# Patient Record
Sex: MALE | Race: WHITE | NOT HISPANIC OR LATINO
[De-identification: names, ages, dates, MRNs, and addresses within clinical notes are randomized per-mention and may not be internally consistent; named-entity substitution may affect disease eponyms.]

---

## 2021-01-28 PROBLEM — Z00.00 ENCOUNTER FOR PREVENTIVE HEALTH EXAMINATION: Status: ACTIVE | Noted: 2021-01-28

## 2021-01-29 ENCOUNTER — APPOINTMENT (OUTPATIENT)
Dept: OTOLARYNGOLOGY | Facility: CLINIC | Age: 56
End: 2021-01-29
Payer: COMMERCIAL

## 2021-01-29 VITALS
HEART RATE: 87 BPM | BODY MASS INDEX: 38.6 KG/M2 | TEMPERATURE: 97.1 F | HEIGHT: 72 IN | WEIGHT: 285 LBS | SYSTOLIC BLOOD PRESSURE: 139 MMHG | DIASTOLIC BLOOD PRESSURE: 80 MMHG

## 2021-01-29 DIAGNOSIS — Z80.1 FAMILY HISTORY OF MALIGNANT NEOPLASM OF TRACHEA, BRONCHUS AND LUNG: ICD-10-CM

## 2021-01-29 DIAGNOSIS — Z86.79 PERSONAL HISTORY OF OTHER DISEASES OF THE CIRCULATORY SYSTEM: ICD-10-CM

## 2021-01-29 DIAGNOSIS — R06.83 SNORING: ICD-10-CM

## 2021-01-29 DIAGNOSIS — F17.200 NICOTINE DEPENDENCE, UNSPECIFIED, UNCOMPLICATED: ICD-10-CM

## 2021-01-29 PROCEDURE — 99072 ADDL SUPL MATRL&STAF TM PHE: CPT

## 2021-01-29 PROCEDURE — 99204 OFFICE O/P NEW MOD 45 MIN: CPT | Mod: 25

## 2021-01-29 PROCEDURE — 69210 REMOVE IMPACTED EAR WAX UNI: CPT

## 2021-01-29 RX ORDER — METOPROLOL TARTRATE 100 MG/1
100 TABLET, FILM COATED ORAL
Refills: 0 | Status: ACTIVE | COMMUNITY

## 2021-01-29 RX ORDER — ATORVASTATIN CALCIUM 10 MG/1
10 TABLET, FILM COATED ORAL
Refills: 0 | Status: ACTIVE | COMMUNITY

## 2021-01-29 RX ORDER — UBIDECARENONE/VIT E ACET 100MG-5
CAPSULE ORAL
Refills: 0 | Status: ACTIVE | COMMUNITY

## 2021-01-29 RX ORDER — GABAPENTIN 300 MG/1
300 CAPSULE ORAL
Refills: 0 | Status: ACTIVE | COMMUNITY

## 2021-01-29 NOTE — ADDENDUM
[FreeTextEntry1] : Documented by Isabel Sena acting as scribe for Dr. Magallon on 01/29/2021.\par \par All Medical record entries made by the Scribe were at my, Dr. Magallon, direction and personally dictated by me on 01/29/2021 . I have reviewed the chart and agree that the record accurately reflects my personal performance of the history, physical exam, assessment and plan. I have also personally directed, reviewed, and agreed with the discharge instructions.

## 2021-01-29 NOTE — ASSESSMENT
[FreeTextEntry1] : Reviewed and reconciled medications, allergies, PMHx, PSHx, SocHx, FMHx.\par \par h/o sleep apnea - uses CPAP\par right parotid mass in the body of the gland\par second right parotid mass in the tail or may be a separation and may be a lymph node\par bilateral cerumen impactions\par \par Plan:\par Spoke on the phone with pt's wife. Cerumen removed. MRI parotid. Discussed r/b/a of bx pending MRI results. FU after test.

## 2021-01-29 NOTE — HISTORY OF PRESENT ILLNESS
[de-identified] : The patient presents with a mass in the right parotid gland. He has one superiorly that he has had for years and one inferiorly that he has had for a few months. Denies any pain or problems with the skin on the right side. He notes that he has had skin lesions removed that were benign. He uses drops in his ears twice per month for the cerumen. Pt is a smoker. FMHx of breast CA and lung CA with pt's mother and lung CA with pt's father. h/o sleep apnea - uses CPAP.

## 2021-01-29 NOTE — CONSULT LETTER
[Dear  ___] : Dear  [unfilled], [Consult Letter:] : I had the pleasure of evaluating your patient, [unfilled]. [Please see my note below.] : Please see my note below. [Consult Closing:] : Thank you very much for allowing me to participate in the care of this patient.  If you have any questions, please do not hesitate to contact me. [Sincerely,] : Sincerely, [FreeTextEntry3] : Everett Magallon MD FACS

## 2021-01-29 NOTE — REVIEW OF SYSTEMS
[Problem Snoring] : problem snoring [Snoring With Pauses] : snoring with pauses [Swelling Neck] : swelling neck [Swelling Face] : face swelling [Swollen Glands] : swollen glands [Negative] : Endocrine

## 2021-01-29 NOTE — PHYSICAL EXAM
[Hearing Fontaine Test (Tuning Fork On Forehead)] : no lateralization of tone [Normal] : no rashes [FreeTextEntry8] : cerumen impaction removed via curettage  [FreeTextEntry9] : cerumen impaction removed via curettage and suction [FreeTextEntry1] : tonsils class 3, type 3 oral cavity, narrowed airway between tonsils and uvula, bony exostosis floor of mouth, no mucosal lesions [FreeTextEntry2] : sinuses nontender to percussion, pigmentation changes right temporal region which appear benign, raised lesion right scalp

## 2021-02-24 ENCOUNTER — APPOINTMENT (OUTPATIENT)
Dept: OTOLARYNGOLOGY | Facility: CLINIC | Age: 56
End: 2021-02-24
Payer: COMMERCIAL

## 2021-02-24 VITALS
WEIGHT: 285 LBS | SYSTOLIC BLOOD PRESSURE: 130 MMHG | BODY MASS INDEX: 38.6 KG/M2 | TEMPERATURE: 97.2 F | HEART RATE: 85 BPM | DIASTOLIC BLOOD PRESSURE: 80 MMHG | HEIGHT: 72 IN

## 2021-02-24 PROCEDURE — 99072 ADDL SUPL MATRL&STAF TM PHE: CPT

## 2021-02-24 PROCEDURE — 99214 OFFICE O/P EST MOD 30 MIN: CPT

## 2021-02-24 RX ORDER — AMLODIPINE BESYLATE 10 MG/1
10 TABLET ORAL
Refills: 0 | Status: ACTIVE | COMMUNITY

## 2021-02-24 RX ORDER — VALSARTAN AND HYDROCHLOROTHIAZIDE 320; 12.5 MG/1; MG/1
320-12.5 TABLET, FILM COATED ORAL
Refills: 0 | Status: ACTIVE | COMMUNITY
Start: 2021-02-24

## 2021-02-24 RX ORDER — ALBUTEROL SULFATE 90 UG/1
108 (90 BASE) POWDER, METERED RESPIRATORY (INHALATION)
Refills: 0 | Status: ACTIVE | COMMUNITY
Start: 2021-02-24

## 2021-02-24 RX ORDER — VALSARTAN 80 MG/1
80 TABLET ORAL
Refills: 0 | Status: DISCONTINUED | COMMUNITY
End: 2021-02-24

## 2021-02-24 NOTE — ASSESSMENT
[FreeTextEntry1] : Reviewed and reconciled medications, allergies, PMHx, PSHx, SocHx, FMHx.\par \par h/o sleep apnea - uses CPAP, right parotid mass in the body of the gland and second right parotid mass in the tail or may be a separation and may be a lymph node\par \par MRI neck 2/9/21: 2 masses in the right parotid one in the body and one in the tail, 1 mass left side in the tail of the parotid\par \par Plan:\par US guided FNA bx parotid masses. FU after test.

## 2021-02-24 NOTE — HISTORY OF PRESENT ILLNESS
[de-identified] : The patient presents with h/o sleep apnea - uses CPAP, right parotid mass in the body of the gland and second right parotid mass in the tail or may be a separation and may be a lymph node. He presents today for results.

## 2021-02-24 NOTE — CONSULT LETTER
[Dear  ___] : Dear  [unfilled], [Courtesy Letter:] : I had the pleasure of seeing your patient, [unfilled], in my office today. [Please see my note below.] : Please see my note below. [Consult Closing:] : Thank you very much for allowing me to participate in the care of this patient.  If you have any questions, please do not hesitate to contact me. [Sincerely,] : Sincerely, [FreeTextEntry3] : Everett Magallon MD FACS

## 2021-02-24 NOTE — ADDENDUM
[FreeTextEntry1] : Documented by Isabel Sena acting as scribe for Dr. Magallon on 02/24/2021.\par \par All Medical record entries made by the Scribe were at my, Dr. Magallon, direction and personally dictated by me on 02/24/2021 . I have reviewed the chart and agree that the record accurately reflects my personal performance of the history, physical exam, assessment and plan. I have also personally directed, reviewed, and agreed with the discharge instructions.

## 2021-03-26 ENCOUNTER — LABORATORY RESULT (OUTPATIENT)
Age: 56
End: 2021-03-26

## 2021-03-26 ENCOUNTER — APPOINTMENT (OUTPATIENT)
Dept: OTOLARYNGOLOGY | Facility: CLINIC | Age: 56
End: 2021-03-26
Payer: COMMERCIAL

## 2021-03-26 VITALS
WEIGHT: 285 LBS | TEMPERATURE: 97.2 F | SYSTOLIC BLOOD PRESSURE: 140 MMHG | DIASTOLIC BLOOD PRESSURE: 80 MMHG | BODY MASS INDEX: 38.6 KG/M2 | HEIGHT: 72 IN

## 2021-03-26 PROCEDURE — 10021 FNA BX W/O IMG GDN 1ST LES: CPT

## 2021-03-26 PROCEDURE — 99072 ADDL SUPL MATRL&STAF TM PHE: CPT

## 2021-03-26 PROCEDURE — 99214 OFFICE O/P EST MOD 30 MIN: CPT | Mod: 25

## 2021-03-26 RX ORDER — ASPIRIN 81 MG/1
81 TABLET, CHEWABLE ORAL
Refills: 0 | Status: COMPLETED | COMMUNITY
Start: 2021-02-24 | End: 2021-03-26

## 2021-03-26 NOTE — ASSESSMENT
[FreeTextEntry1] : Reviewed and reconciled medications, allergies, PMHx, PSHx, SocHx, FMHx. \par \par h/o sleep apnea - uses CPAP, right parotid mass in the body of the gland and second right parotid mass in the tail or may be a separation and may be a lymph node\par pain and discomfort over the right parotid\par \par MRI neck 2/9/21: 2 masses in the right parotid one in the body and one in the tail, 1 mass left side in the tail of the parotid, suggesting Warthin's tumors\par \par FNA bx right parotid masses 3/15/21: benign findings, hemorrhagic cyst contents\par \par Plan:\par Reviewed MRI and FNA bx results. I&D Right Parotid Mass - specimen sent out for pathology - pending results. Ice the right parotid. Discussed r/b/a of of excision if mass keeps filling up. Scopolamine disc. Pt urged to stop smoking. FU 1 week.

## 2021-03-26 NOTE — PROCEDURE
[FreeTextEntry1] : I&D Right Parotid Masses [FreeTextEntry3] : Procedure was explained to the patient. The risks, benefits, and alternatives were discussed with the patient. Patient gave consent to continue. Alcohol was used to clean the area. A needle was used to aspirate the masses. Blood was aspirated from the masses, anterior greater than tail. Specimen collected and sent out for pathology. Pt tolerate the procedure well.

## 2021-03-26 NOTE — HISTORY OF PRESENT ILLNESS
[de-identified] : The patient presents with h/o sleep apnea - uses CPAP, right parotid mass in the body of the gland and second right parotid mass in the tail or may be a separation and may be a lymph node. Pt presents today for results. He has been in a lot of pain the past few days and is very uncomfortable. There is swelling over the right parotid. He d/c the Aspirin. Pt is a smoker.

## 2021-03-26 NOTE — ADDENDUM
[FreeTextEntry1] : Documented by Isabel Sena acting as scribe for Dr. Magallon on 03/26/2021.\par \par All Medical record entries made by the Scribe were at my, Dr. Magallon, direction and personally dictated by me on 03/26/2021 . I have reviewed the chart and agree that the record accurately reflects my personal performance of the history, physical exam, assessment and plan. I have also personally directed, reviewed, and agreed with the discharge instructions.

## 2021-04-02 ENCOUNTER — APPOINTMENT (OUTPATIENT)
Dept: OTOLARYNGOLOGY | Facility: CLINIC | Age: 56
End: 2021-04-02
Payer: COMMERCIAL

## 2021-04-02 VITALS
SYSTOLIC BLOOD PRESSURE: 134 MMHG | HEIGHT: 72 IN | BODY MASS INDEX: 38.6 KG/M2 | WEIGHT: 285 LBS | HEART RATE: 82 BPM | TEMPERATURE: 98.2 F | DIASTOLIC BLOOD PRESSURE: 74 MMHG

## 2021-04-02 PROCEDURE — 99072 ADDL SUPL MATRL&STAF TM PHE: CPT

## 2021-04-02 PROCEDURE — 10021 FNA BX W/O IMG GDN 1ST LES: CPT

## 2021-04-02 PROCEDURE — 99213 OFFICE O/P EST LOW 20 MIN: CPT | Mod: 25

## 2021-04-02 NOTE — HISTORY OF PRESENT ILLNESS
[de-identified] : The patient presents with h/o sleep apnea - uses CPAP and 2 right parotid masses - pathology showed hemorrhagic cyst. He still feels the mass is "rock hard". He has slightly less pain. He has been using the Scopolamine disc with no improvement.

## 2021-04-02 NOTE — ASSESSMENT
[FreeTextEntry1] : Reviewed and reconciled medications, allergies, PMHx, PSHx, SocHx, FMHx.\par \par h/o sleep apnea - uses CPAP and 2 right parotid masses - pathology showed hemorrhagic cyst\par continued swelling\par \par Plan:\par I&D Right Parotid Masses. Will consult head and neck guys regarding possible injection of tetracycline. FU after pathology results come in.

## 2021-04-02 NOTE — ADDENDUM
[FreeTextEntry1] : Documented by Isabel Sena acting as scribe for Dr. Magallon on 04/02/2021.\par \par All Medical record entries made by the Scribe were at my, Dr. Magallon, direction and personally dictated by me on 04/02/2021 . I have reviewed the chart and agree that the record accurately reflects my personal performance of the history, physical exam, assessment and plan. I have also personally directed, reviewed, and agreed with the discharge instructions.

## 2021-04-02 NOTE — PROCEDURE
[FreeTextEntry1] : I&D Right Parotid Masses [FreeTextEntry3] : Procedure was explained to the patient. The risks, benefits, and alternatives were discussed with the patient. Patient gave consent to continue. Alcohol was used to clean the area. A needle was used to aspirate the masses. 3 cc of blood was aspirated from the masses. Specimen collected and sent out for pathology. Pt tolerate the procedure well.

## 2021-04-02 NOTE — CONSULT LETTER
[Courtesy Letter:] : I had the pleasure of seeing your patient, [unfilled], in my office today. [Please see my note below.] : Please see my note below. [Consult Closing:] : Thank you very much for allowing me to participate in the care of this patient.  If you have any questions, please do not hesitate to contact me. [Sincerely,] : Sincerely, [FreeTextEntry3] : Everett Magallon MD FACS

## 2021-04-02 NOTE — REASON FOR VISIT
[Subsequent Evaluation] : a subsequent evaluation for [FreeTextEntry2] : follow up mass right side of face

## 2021-04-06 LAB — CORE LAB BIOPSY: NORMAL

## 2022-11-26 ENCOUNTER — NON-APPOINTMENT (OUTPATIENT)
Age: 57
End: 2022-11-26

## 2022-11-29 ENCOUNTER — APPOINTMENT (OUTPATIENT)
Dept: OTOLARYNGOLOGY | Facility: CLINIC | Age: 57
End: 2022-11-29

## 2022-11-29 VITALS
DIASTOLIC BLOOD PRESSURE: 83 MMHG | WEIGHT: 285 LBS | SYSTOLIC BLOOD PRESSURE: 129 MMHG | HEART RATE: 83 BPM | HEIGHT: 72 IN | BODY MASS INDEX: 38.6 KG/M2

## 2022-11-29 DIAGNOSIS — K11.6 MUCOCELE OF SALIVARY GLAND: ICD-10-CM

## 2022-11-29 PROCEDURE — 99214 OFFICE O/P EST MOD 30 MIN: CPT | Mod: 25

## 2022-11-29 PROCEDURE — 69210 REMOVE IMPACTED EAR WAX UNI: CPT

## 2022-11-29 PROCEDURE — 42400 BIOPSY OF SALIVARY GLAND: CPT

## 2022-11-29 NOTE — PROCEDURE
[FreeTextEntry1] : parotid mass [FreeTextEntry2] : Procedure: Bilateral I&D parotid mass [FreeTextEntry3] : Procedure: Bilateral I&D parotid mass Procedure was explained to the patient. The risks, benefits, and alternatives were discussed with the patient. Patient gave consent to continue.A needle was used to aspirate the mass. Pt was provided ice water to gargle with. Pt tolerate the procedure well.\par

## 2022-11-29 NOTE — CONSULT LETTER
[Dear  ___] : Dear  [unfilled], [Courtesy Letter:] : I had the pleasure of seeing your patient, [unfilled], in my office today. [Please see my note below.] : Please see my note below. [Referral Closing:] : Thank you very much for seeing this patient.  If you have any questions, please do not hesitate to contact me. [Sincerely,] : Sincerely, [FreeTextEntry3] : Everett Magallon MD FACS\par

## 2022-11-29 NOTE — ASSESSMENT
[FreeTextEntry1] : Reviewed and reconciled medications, allergies, PMHx, PSHx, SocHx, FMHx.\par \par h/o sleep apnea - uses CPAP and 2 right parotid masses - pathology showed hemorrhagic cyst\par continued swelling\par \par physical exam:\par cerumen impaction removed via curettage bilaterally\par type 3 oral cavity type 3 tonsil\par large uvula\par \par \par \par Procedure: Bilateral I&D parotid mass Procedure was explained to the patient. The risks, benefits, and alternatives were discussed with the patient. Patient gave consent to continue.A needle was used to aspirate the mass. Pt was provided ice water to gargle with. Pt tolerate the procedure well.\par \par Patient interested in having mass removed in OR. Discussed r/b/a of bilateral parotid mass removal\par \par Plan:\par I&D bilateral Parotid Masses.\par Ultasound of mass\par Pathology sent for both ID locations\par Follow up after test\par

## 2022-11-29 NOTE — HISTORY OF PRESENT ILLNESS
[de-identified] : The patient presents with h/o sleep apnea - uses CPAP and 2 right parotid masses - pathology showed hemorrhagic cyst. He states he feels like parotid mass has gotten larger especially on the right side and somewhat on the left

## 2022-11-29 NOTE — PHYSICAL EXAM
[Normal] : salivary glands are normal [Hearing Loss Right Only] : normal [Hearing Loss Left Only] : normal [FreeTextEntry8] : cerumen impaction removed via curettage [FreeTextEntry9] : cerumen impaction removed via curettage [de-identified] : tonsil class 3 , type 3 oral cavity

## 2022-12-02 LAB — CORE LAB BIOPSY: NORMAL

## 2022-12-05 LAB — BACTERIA FLD CULT: NORMAL

## 2022-12-07 ENCOUNTER — TRANSCRIPTION ENCOUNTER (OUTPATIENT)
Age: 57
End: 2022-12-07

## 2022-12-15 ENCOUNTER — TRANSCRIPTION ENCOUNTER (OUTPATIENT)
Age: 57
End: 2022-12-15

## 2022-12-22 ENCOUNTER — APPOINTMENT (OUTPATIENT)
Dept: OTOLARYNGOLOGY | Facility: CLINIC | Age: 57
End: 2022-12-22

## 2022-12-22 VITALS
DIASTOLIC BLOOD PRESSURE: 80 MMHG | HEART RATE: 85 BPM | BODY MASS INDEX: 38.6 KG/M2 | HEIGHT: 72 IN | WEIGHT: 285 LBS | TEMPERATURE: 98 F | SYSTOLIC BLOOD PRESSURE: 128 MMHG

## 2022-12-22 DIAGNOSIS — Z80.3 FAMILY HISTORY OF MALIGNANT NEOPLASM OF BREAST: ICD-10-CM

## 2022-12-22 DIAGNOSIS — Z86.69 PERSONAL HISTORY OF OTHER DISEASES OF THE NERVOUS SYSTEM AND SENSE ORGANS: ICD-10-CM

## 2022-12-22 DIAGNOSIS — Z86.39 PERSONAL HISTORY OF OTHER ENDOCRINE, NUTRITIONAL AND METABOLIC DISEASE: ICD-10-CM

## 2022-12-22 PROCEDURE — 99214 OFFICE O/P EST MOD 30 MIN: CPT

## 2022-12-22 RX ORDER — SCOPOLAMINE 1.5 MG/1
1 PATCH, EXTENDED RELEASE TRANSDERMAL
Qty: 3 | Refills: 6 | Status: COMPLETED | COMMUNITY
Start: 2021-04-07 | End: 2022-12-22

## 2022-12-22 RX ORDER — SPIRONOLACTONE 50 MG/1
TABLET ORAL
Refills: 0 | Status: ACTIVE | COMMUNITY

## 2022-12-22 RX ORDER — SCOPOLAMINE 1.5 MG/1
1 PATCH, EXTENDED RELEASE TRANSDERMAL
Qty: 3 | Refills: 0 | Status: COMPLETED | COMMUNITY
Start: 2021-03-26 | End: 2022-12-22

## 2022-12-22 NOTE — ASSESSMENT
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  Options for management d/w pt including observation with serial sonography, vs surgical removal.\par \par Risks of parotid surgery were discussed with patient, including, but not limited to:\par Facial nerve injury with resulting temporary or even permanent facial paralysis/paresis\par Greater auricular nerve injury with temporary/permanent ear/face numbness\par Facial asymmetry\par Preauricular and neck scarring\par Keloid\par Tumor recurrence\par Yessenia Syndrome\par First Bite Syndrome\par \par Pt to consider these options and will f/u.

## 2022-12-22 NOTE — HISTORY OF PRESENT ILLNESS
[de-identified] : 57 year old male referred by Dr Nicole for bilateral neck masses. States first noticed masses 2 year ago first had a mass on the right side of his neck and then 4-6 months noticed  left side neck mass. Denies pain on palpation, pain or limitation with neck ROM,  dysphagia, odynophagia, dyspnea, dysphonia, night sweats, chills, fevers, or otalgia. Currently smokes half a pack a day and drinks alcohol occasionally. \par \par Thyroid/Neck/Ultrasound 12/14/22\par IMPRESSION:\par Enlarged heterogeneous hypoechoic structures in the bilateral parotid glands\par previously biopsied and consistent with hemorrhagic cysts.\par \par Pathology 11/29/22\par Final Diagnosis\par 1. Parotid gland, left, biopsy\par      - Minute fragments of oncocytic epithelium with lymphoid tissue,\par \par  suggestive of  Warthin tumor, Clinical correlation is recommended\par \par 2. Parotid gland, right, biopsy\par      - Minute fragments of oncocytic epithelium with scanty lymphoid\par  tissue, and proteinaceous material, suggestive of  Warthin tumor;

## 2022-12-22 NOTE — PHYSICAL EXAM
[Midline] : trachea located in midline position [Normal] : no rashes [FreeTextEntry1] : Overweight [de-identified] : No adenopathy [de-identified] : bilateral bulky parotid masses

## 2022-12-22 NOTE — DATA REVIEWED
[de-identified] : \par  Tissue Biopsy             Final\par \par No Documents Attached\par \par \par Result Annotated [id number] 01:58PM by ARTURO JEANIERAQUEL CHRISTIAN\par \par \par consistent with benign warthins tumor bilaterally\par \par \par   Patient:   GINO QUACH\par \par \par Accession:                             [id number]\par \par Collected Date/Time:                   11/29/2022 15:34 EST\par Received Date/Time:                    11/30/2022 04:17 EST\par \par Surgical Pathology Report - Auth (Verified)\par \par Specimen(s) Submitted\par 1. Left parotid\par 2. Right parotid\par \par Final Diagnosis\par \par 1. Parotid gland, left, biopsy\par      - Minute fragments of oncocytic epithelium with lymphoid tissue,\par \par  suggestive of  Warthin tumor, Clinical correlation is recommended\par \par 2. Parotid gland, right, biopsy\par      - Minute fragments of oncocytic epithelium with scanty lymphoid\par  tissue, and proteinaceous material, suggestive of  Warthin tumor;\par  Clinical correlation is recommended\par *** Image not supported for this output type ***\par Verified by: CLARE PARSONS MD\par (Electronic Signature)\par Reported on: 12/02/22 09:37 EST, [facility], [address]par  [facility] [address] [address]\par Phone: [phone]   Fax: [phone]\par _________________________________________________________________\par \par \par Clinical Information\par K11.8,K11.6\par \par Gross Description\par 1.  Received: In formalin labeled  "left parotid"\par Size:  0.1 x 0.1 x 0.1 cm aggregate\par Description:  Minute soft white tissue\par Additional Comments:   The specimen has been filtered. Eosin has been added\par  for identification purposes.\par Submitted:  Entirely in one cassette: 1A\par \par 2.  Received: In formalin labeled  "right parotid"\par Size:  0.6 x 0.5 x 0.1 cm aggregate\par Description:  Minute soft tan tissue fragments\par Additional Comments:   The specimen has been filtered.\par Submitted:  Entirely in one cassette: 2A\par \par Angeles Zarate PA 11/30/2022 05:28 PM\par \par Disclaimer\par \par In addition to other data that may appear on the specimen containers, all\par  labels have been inspected to confirm the presence of the patients name\par  and date of birth.\par \par Histological Processing Performed at [facility],\par  Department of Pathology, [address] [place] [address].\par \par  \par \par  Ordered by: JEANIE MORRIS       Collected/Examined: 29Nov2022 03:34PM       \par Verified by: JEANIE MORRIS 02Dec2022 01:58PM       \par  Result Communication: No patient communication needed at this time;\par Stage: Final       \par  Performed at: [facility] (Med Director: Javier Pineda)       Resulted: [id number] 09:37AM       Last Updated: 22Dec2022 10:22AM       Accession: [id number]

## 2022-12-22 NOTE — CONSULT LETTER
[Dear  ___] : Dear  [unfilled], [Consult Letter:] : I had the pleasure of evaluating your patient, [unfilled]. [Please see my note below.] : Please see my note below. [Consult Closing:] : Thank you very much for allowing me to participate in the care of this patient.  If you have any questions, please do not hesitate to contact me. [Sincerely,] : Sincerely, [FreeTextEntry2] : Samuel Nicole, DO [FreeTextEntry3] : Surjit Kovacs MD, FACS\par Chief of Otolaryngology at [facility]\par \par Dept. of Otolaryngology\par [facility]\par

## 2023-05-22 ENCOUNTER — APPOINTMENT (OUTPATIENT)
Dept: OTOLARYNGOLOGY | Facility: CLINIC | Age: 58
End: 2023-05-22
Payer: COMMERCIAL

## 2023-05-22 VITALS
WEIGHT: 285 LBS | SYSTOLIC BLOOD PRESSURE: 115 MMHG | BODY MASS INDEX: 38.6 KG/M2 | HEART RATE: 78 BPM | DIASTOLIC BLOOD PRESSURE: 78 MMHG | HEIGHT: 72 IN

## 2023-05-22 DIAGNOSIS — J35.1 HYPERTROPHY OF TONSILS: ICD-10-CM

## 2023-05-22 PROCEDURE — 69210 REMOVE IMPACTED EAR WAX UNI: CPT

## 2023-05-22 PROCEDURE — 99214 OFFICE O/P EST MOD 30 MIN: CPT | Mod: 25

## 2023-05-22 NOTE — ASSESSMENT
[FreeTextEntry1] : Reviewed and reconciled medications, allergies, PMHx, PSHx, SocHx, FMHx.\par \par The patient presents with h/o sleep apnea - uses CPAP and 2 right parotid masses - pathology showed hemorrhagic cyst presents today for 6 month follow up. Attempted I&D of b/l parotid masses 11/29/22 but it was too painful and didn't drain easily. \par \par Physical Exam -\par right ear cerumen impaction removed via curettage \par left ear cerumen impaction removed via curettage \par deviated septum, inflamed turbinates \par tonsils class 3\par b/l parotid masses\par \par US thyroid/neck/head 12/14/22 - enlarged heterogeneous hypoechoic structures in the b/l parotid glands previously biopsied and consistent with hemorrhagic cysts.\par \par Plan: \par Discussed r/b/a of b/l parotid mass removal - risks of facial paralysis. Discussed r/b/a of radiating masses - risks of cancer. Will see if device for thyroid will be approved for parotid mass (conference in September). Will just wait and watch for now. FU 6 months.

## 2023-05-22 NOTE — PHYSICAL EXAM
[] : septum deviated bilaterally [Midline] : trachea located in midline position [Normal] : orientation to person, place, and time: normal [FreeTextEntry1] : b/l parotid masses [FreeTextEntry8] : cerumen impaction removed via curettage  [FreeTextEntry9] : cerumen impaction removed via curettage  [de-identified] : inflamed turbinates  [de-identified] : class 3 [FreeTextEntry2] : sinuses nontender to percussion.

## 2023-05-22 NOTE — ADDENDUM
[FreeTextEntry1] : Documented by Federica Hankins acting as scribe for Dr. Magallon on 05/22/2023.\par \par All Medical record entries made by the scribe were at my, Dr. Magallon,direction and personally dictated by me on 05/22/2023. I have reviewed the chart and agree that the record accurately reflects my personal performance of the history, physical exam, assessment and plan. I have also personally directed, reviewed, and agreed with the discharge instructions.

## 2023-05-22 NOTE — HISTORY OF PRESENT ILLNESS
[de-identified] : The patient presents with h/o sleep apnea - uses CPAP and 2 right parotid masses - pathology showed hemorrhagic cyst presents today for 6 month follow up. Attempted I&D of b/l parotid masses 11/29/22 but it was too painful and didn't drain easily. Pt notes he saw Dr. Kovacs to get a second opinion on removing parotid masses but he explained the risks to the surgery and removing them wasn't necessary. Pt notes he doesn't think the right side parotid mass grew, but the left side has. Denies any pain or discomfort from the parotid masses.

## 2023-11-20 ENCOUNTER — APPOINTMENT (OUTPATIENT)
Dept: OTOLARYNGOLOGY | Facility: CLINIC | Age: 58
End: 2023-11-20
Payer: COMMERCIAL

## 2023-11-20 VITALS
SYSTOLIC BLOOD PRESSURE: 122 MMHG | HEIGHT: 72 IN | HEART RATE: 86 BPM | WEIGHT: 285 LBS | DIASTOLIC BLOOD PRESSURE: 81 MMHG | BODY MASS INDEX: 38.6 KG/M2

## 2023-11-20 PROCEDURE — 99214 OFFICE O/P EST MOD 30 MIN: CPT | Mod: 25

## 2023-11-20 PROCEDURE — 69210 REMOVE IMPACTED EAR WAX UNI: CPT

## 2024-05-13 ENCOUNTER — APPOINTMENT (OUTPATIENT)
Dept: OTOLARYNGOLOGY | Facility: CLINIC | Age: 59
End: 2024-05-13
Payer: COMMERCIAL

## 2024-05-13 VITALS
DIASTOLIC BLOOD PRESSURE: 82 MMHG | HEIGHT: 72 IN | WEIGHT: 292 LBS | BODY MASS INDEX: 39.55 KG/M2 | SYSTOLIC BLOOD PRESSURE: 143 MMHG | HEART RATE: 83 BPM

## 2024-05-13 DIAGNOSIS — R22.1 LOCALIZED SWELLING, MASS AND LUMP, HEAD: ICD-10-CM

## 2024-05-13 DIAGNOSIS — J31.0 CHRONIC RHINITIS: ICD-10-CM

## 2024-05-13 DIAGNOSIS — J34.2 DEVIATED NASAL SEPTUM: ICD-10-CM

## 2024-05-13 DIAGNOSIS — H61.23 IMPACTED CERUMEN, BILATERAL: ICD-10-CM

## 2024-05-13 DIAGNOSIS — R22.0 LOCALIZED SWELLING, MASS AND LUMP, HEAD: ICD-10-CM

## 2024-05-13 DIAGNOSIS — G47.33 OBSTRUCTIVE SLEEP APNEA (ADULT) (PEDIATRIC): ICD-10-CM

## 2024-05-13 DIAGNOSIS — K11.8 OTHER DISEASES OF SALIVARY GLANDS: ICD-10-CM

## 2024-05-13 PROCEDURE — 69210 REMOVE IMPACTED EAR WAX UNI: CPT

## 2024-05-13 PROCEDURE — 99214 OFFICE O/P EST MOD 30 MIN: CPT | Mod: 25

## 2024-05-13 NOTE — ADDENDUM
[FreeTextEntry1] :  Documented by Markos Alarcon acting as scribe for Dr. Magallon on 05/13/2024. All Medical record entries made by the Scribe were at my, Dr. Magallon, direction and personally dictated by me on 05/13/2024 . I have reviewed the chart and agree that the record accurately reflects my personal performance of the history, physical exam, assessment and plan. I have also personally directed, reviewed, and agreed with the discharge instructions.

## 2024-05-13 NOTE — ASSESSMENT
[FreeTextEntry1] : Reviewed and reconciled medications, allergies, PMHx, PSHx, SocHx, FMHx.   Pt. with h/o sleep apnea - uses CPAP and 2 right parotid masses - pathology showed hemorrhagic cyst, and attempted I&D of b/l parotid masses 11/29/22 but it was too painful and didn't drain easily presents today for a 6 month follow up. He denies feeling a clogged sensation in his ears. He states that he put drops in his ears a few weeks ago. He states that he cut his smoking down to half a pack per day.  US Neck 12/14/22 -right parotid mass 5 x 3.4 x 2.8 -left parotid mass 3 x 2 x 2.7  Pathology 11/29/22: -suggestive of Warthin tumor  Compliance report 5/13/24: -100% usage -averages about 3 hours per night -AHI 0.5  Physical exam: -right ear canal: cerumen impaction removed via curettage and suction. narrow canal -left ear canal: cerumen impaction removed via curettage and suction. narrow canal -no lateralization to tuning forks -mildly deviated septum -mildly inflamed turbinates -type 2 oral cavity -class 2 tonsils -parotid mass bilaterally R>L   Plan: -Ordered US neck -FU with results from US

## 2024-05-13 NOTE — HISTORY OF PRESENT ILLNESS
[de-identified] :  Pt. with h/o sleep apnea - uses CPAP and 2 right parotid masses - pathology showed hemorrhagic cyst, and attempted I&D of b/l parotid masses 11/29/22 but it was too painful and didn't drain easily presents today for a 6 month follow up. He denies feeling a clogged sensation in his ears. He states that he put drops in his ears a few weeks ago. He states that he cut his smoking down to half a pack per day.

## 2024-05-13 NOTE — DATA REVIEWED
[de-identified] : Pathology 11/29/22: -suggestive of Warthin tumor [de-identified] : US Neck 12/14/22 -right parotid mass 5 x 3.4 x 2.8 -left parotid mass 3 x 2 x 2.7 [de-identified] : Compliance report 5/13/24: -100% usage -averages about 3 hours per night -AHI 0.5

## 2024-05-13 NOTE — PHYSICAL EXAM
[Hearing Fontaine Test (Tuning Fork On Forehead)] : no lateralization of tone [] : septum deviated bilaterally [Midline] : trachea located in midline position [Normal] : no rashes [de-identified] : parotid mass bilaterally R>L [Hearing Loss Right Only] : normal [Hearing Loss Left Only] : normal [FreeTextEntry8] :  cerumen impaction removed via curettage and suction. narrow canal [FreeTextEntry9] :  cerumen impaction removed via curettage and suction. narrow canal [de-identified] :  mildly inflamed turbinates [de-identified] : class 2 [de-identified] : type 2 oral cavity

## 2024-05-13 NOTE — REASON FOR VISIT
[Subsequent Evaluation] : a subsequent evaluation for [FreeTextEntry2] : ear clogging, sleep apnea, parotid mass growing